# Patient Record
Sex: MALE | Race: AMERICAN INDIAN OR ALASKA NATIVE
[De-identification: names, ages, dates, MRNs, and addresses within clinical notes are randomized per-mention and may not be internally consistent; named-entity substitution may affect disease eponyms.]

---

## 2019-10-16 ENCOUNTER — HOSPITAL ENCOUNTER (EMERGENCY)
Dept: HOSPITAL 5 - ED | Age: 34
Discharge: HOME | End: 2019-10-16
Payer: SELF-PAY

## 2019-10-16 VITALS — SYSTOLIC BLOOD PRESSURE: 106 MMHG | DIASTOLIC BLOOD PRESSURE: 74 MMHG

## 2019-10-16 DIAGNOSIS — R36.9: ICD-10-CM

## 2019-10-16 DIAGNOSIS — Z79.899: ICD-10-CM

## 2019-10-16 DIAGNOSIS — M25.532: Primary | ICD-10-CM

## 2019-10-16 PROCEDURE — 96372 THER/PROPH/DIAG INJ SC/IM: CPT

## 2019-10-16 PROCEDURE — 73100 X-RAY EXAM OF WRIST: CPT

## 2019-10-16 PROCEDURE — 29125 APPL SHORT ARM SPLINT STATIC: CPT

## 2019-10-16 PROCEDURE — 99283 EMERGENCY DEPT VISIT LOW MDM: CPT

## 2019-10-16 NOTE — XRAY REPORT
Left wrist, 2 views 



INDICATION: Pain following injury yesterday



FINDINGS: The joint space is maintained. There is no fracture or dislocation. No spurring or arthriti
c change. No bone lesion or periostitis. No significant abnormality.



IMPRESSION: Negative study



Signer Name: Osvaldo Machado MD 

Signed: 10/16/2019 8:23 AM

 Workstation Name: [email or web address]

## 2019-10-16 NOTE — EMERGENCY DEPARTMENT REPORT
HPI





- General


Chief Complaint: Extremity Injury, Upper


Time Seen by Provider: 10/16/19 08:15





- HPI


HPI: 


34-year-old -American male presents to the emergency department with 2 

complaints.  First, the patient fell on his left wrist yesterday while playing 

basketball.  He has been having pain to the wrist and distal forearm.  He used 

some ice and then ICY hot.  He says that there is no restriction to range of 

motion but he has discomfort.  He is right-hand dominant.  Secondly, the patient

has been having a few days of some penile discharge.  He denies any burning with

urination or fever or general lesions.  He says that his significant other was 

positive for some type of "bacteria." 








ED Past Medical Hx





- Past Medical History


Previous Medical History?: No





- Surgical History


Past Surgical History?: No





- Social History


Smoking Status: Never Smoker


Substance Use Type: None





- Medications


Home Medications: 


                                Home Medications











 Medication  Instructions  Recorded  Confirmed  Last Taken  Type


 


Ibuprofen [Motrin 800 MG tab] 800 mg PO Q8HR PRN #20 tablet 10/16/19  Unknown Rx














ED Review of Systems


ROS: 


Stated complaint: L WRIST/DISCHARGE


Other details as noted in HPI





Comment: All other systems reviewed and negative


Constitutional: denies: chills, fever


Gastrointestinal: denies: abdominal pain


Genitourinary: discharge.  denies: dysuria, testicular pain


Musculoskeletal: arthralgia.  denies: joint swelling


Skin: denies: rash, lesions


Neurological: denies: numbness, paresthesias





Physical Exam





- Physical Exam


Vital Signs: 


                                   Vital Signs











  10/16/19





  08:02


 


Temperature 97.7 F


 


Pulse Rate 67


 


Respiratory 16





Rate 


 


Blood Pressure 106/74


 


O2 Sat by Pulse 98





Oximetry 











Physical Exam: 





GENERAL: The patient is well-developed well-nourished.


HENT: Normocephalic.  Atraumatic.    Patient has moist mucous membranes.


EYES: Extraocular motions are intact.  


NECK: Supple. Trachea is midline.


ABDOMEN: There is no abdominal distention.


SKIN: Skin is warm and dry.


NEURO: The patient is awake, alert, and oriented.  The patient is cooperative.  

The patient has no focal neurologic deficits.  Normal speech.


MUSCULOSKELETAL: There is some tenderness to palpation to the left wrist and 

distal forearm but no obvious deformity.  There is no limitation range of 

motion.  Radial pulse +2 over 4 to the affected left wrist.


: Deferred





ED Course


                                   Vital Signs











  10/16/19





  08:02


 


Temperature 97.7 F


 


Pulse Rate 67


 


Respiratory 16





Rate 


 


Blood Pressure 106/74


 


O2 Sat by Pulse 98





Oximetry 














ED Medical Decision Making





- Radiology Data


Radiology results: image reviewed


interpreted by me: 





X-ray of the left wrist does not show any fracture, dislocation or any acute 

process.





- Medical Decision Making





Regarding the patient's wrist pain, x-ray was done that does not show any 

fracture, dislocation, or any acute process.  Will be placed in a wrist splint 

and given a referral for orthopedists.





Regarding the penile discharge, there seems to be some exposure to some type of 

STD or bacterial infection and the patient has active discharge.  He will be 

treated empirically with Rocephin and azithromycin.  Discussed safe sex p

ractices and avoiding any sexual contact for at least one week after treated.





He will return to the emergency Department with any worsening of his symptoms or

any acute distress.





- Differential Diagnosis


wrist fracture, wrist sprain, contusion, STD


Critical Care Time: No


Critical care attestation.: 


If time is entered above; I have spent that time in minutes in the direct care 

of this critically ill patient, excluding procedure time.








ED Disposition


Clinical Impression: 


 Left wrist pain, Penile discharge





Disposition: DC-01 TO HOME OR SELFCARE


Is pt being admited?: No


Condition: Stable


Instructions:  Sexually Transmitted Diseases (ED), Safe Sex (ED), Wrist Injury 

(ED)


Additional Instructions: 


Please follow-up with a primary care physician.  I am giving you a referral for 

a local orthopedist, Dr. Gordillo, to follow up regarding your wrist pain.





Please avoid any sexual activity for at least one week after getting the 

medications here today for empiric treatment of possible STD.





Return to the emergency Department with any worsening of your symptoms or any 

acute distress.


Prescriptions: 


Ibuprofen [Motrin 800 MG tab] 800 mg PO Q8HR PRN #20 tablet


 PRN Reason: Pain , Severe (7-10)


Referrals: 


WALDEMAR GORDILLO MD [Staff Physician] - 2-3 Days


[facility] [Outside] - 2-3 Days


Time of Disposition: 08:43

## 2020-01-15 ENCOUNTER — HOSPITAL ENCOUNTER (EMERGENCY)
Dept: HOSPITAL 5 - ED | Age: 35
Discharge: HOME | End: 2020-01-15
Payer: COMMERCIAL

## 2020-01-15 VITALS — DIASTOLIC BLOOD PRESSURE: 57 MMHG | SYSTOLIC BLOOD PRESSURE: 119 MMHG
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
## 2020-01-15 DIAGNOSIS — S46.812A: Primary | ICD-10-CM

## 2020-01-15 DIAGNOSIS — F17.200: ICD-10-CM

## 2020-01-15 DIAGNOSIS — S39.012A: ICD-10-CM

## 2020-01-15 DIAGNOSIS — F12.10: ICD-10-CM
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
## 2020-01-15 PROCEDURE — 99282 EMERGENCY DEPT VISIT SF MDM: CPT
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
